# Patient Record
(demographics unavailable — no encounter records)

---

## 2025-02-18 NOTE — HISTORY OF PRESENT ILLNESS
[de-identified] : 72 yr old female presents w recently noticed left groin bulge she saw in the shower about 2 months ago. She is minimally symptomatic.   Her PCP, Dr. Good evaluated her and referred her for possible inguinal hernia. She had an US which was read as LLQ hernia containing bowel.  She has a hx of an epigastric hernia repair in the 1990's which she think was performed w mesh.She has also had benign breast bx and a R THR.  She lives alone but her daughter lives nearby to help if needed after surgery.  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

## 2025-02-18 NOTE — PLAN
[FreeTextEntry1] : CT scan pelvis.  Robotic left inguinal hernia surgery likely - depending on results.

## 2025-02-18 NOTE — CONSULT LETTER
[Dear  ___] : Dear  [unfilled], [Courtesy Letter:] : I had the pleasure of seeing your patient, [unfilled], in my office today. [Please see my note below.] : Please see my note below. [Consult Closing:] : Thank you very much for allowing me to participate in the care of this patient.  If you have any questions, please do not hesitate to contact me. [Sincerely,] : Sincerely, [FreeTextEntry3] : Martita Ashby MD FACS Director, [facility] Division of General and Acute Care Surgery Director, Surgical Quality for [facility] Interim Director [facility] W[facility]   Office phone: [phone] Cell phone:  [phone] email:  [email or web address]

## 2025-02-18 NOTE — PHYSICAL EXAM
[Respiratory Effort] : normal respiratory effort [de-identified] : NAD [de-identified] : nml [de-identified] : + left groin hernia, reducible, nontender.  No hernia on the right.

## 2025-02-18 NOTE — ASSESSMENT
[FreeTextEntry1] : LEOPOLDO PARHAM has a RIGHT inguinal hernia determined by both physical exam and US.  She would like to have a CT scan to determine if there is colon or small bowel in her hernia. We discussed the options for management which include observation of asymptomatic or minimally symptomatic hernias, as well as surgical repair.  I have recommended a robotic approach to this particular hernia given the quicker recovery time and superior visualization of the anatomy.  We discussed the risks of the surgery which include,but are not limited to injury to the gonadal structures, intestinal injury, vascular injury, hernia recurrence, mesh infection, seroma, or hematoma.  We discussed the likelihood of bruising and swelling in the immediate post op period in the groin and that this should be managed with ice packs for the first 24 hrs. Post operative activity restrictions include no heavy lifting or core activities/straining for 14 days. Routine daily activities can be resumed in 1-2 days and light activity (such as walking for exercise or jogging) can be resumed in 7 days.  Driving can be resumed when pain level is felt to be manageable.   No swimming in pool or hot tub for 1 week post operatively.  Return to work is generally in 7-10 days,but is individualized according to the patient.  Ms. PARHAM understands the risks benefits and alternatives of the proposed inguinal hernia repair and would like to proceed.   We will determine a date for surgery at her convenience and obtain medical clearance as appropriate.  We will get a CT scan first and I will call her w the results.  She will alert the office once she has had hte CT performed.

## 2025-03-15 NOTE — DATA REVIEWED
[FreeTextEntry1] : Ct scan from 2/28/25 - I personally reviewed images and reviewed them with radiologist as well.

## 2025-03-15 NOTE — ASSESSMENT
[FreeTextEntry1] : LEOPOLDO PARHAM has a LEFT inguinal hernia determined by both physical exam and CT scan.  We discussed the options for management which include observation of asymptomatic or minimally symptomatic hernias, as well as surgical repair.  I have recommended a robotic approach to this particular hernia given the quicker recovery time and superior visualization of the anatomy.  We discussed the risks of the surgery which include,but are not limited to injury to the gonadal structures, intestinal injury, vascular injury, hernia recurrence, mesh infection, seroma, or hematoma.  We discussed the likelihood of bruising and swelling in the immediate post op period in the groin and that this should be managed with ice packs for the first 24 hrs. Post operative activity restrictions include no heavy lifting or core activities/straining for 14 days. Routine daily activities can be resumed in 1-2 days and light activity (such as walking for exercise or jogging) can be resumed in 7 days.  Driving can be resumed when pain level is felt to be manageable.   No swimming in pool or hot tub for 1 week post operatively.  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 Ms. PARHAM understands the risks benefits and alternatives of the proposed inguinal hernia repair and would like to proceed.   We will determine a date for surgery at her convenience and obtain medical clearance as appropriate.  She would like to possible have this during April school spring break.

## 2025-03-15 NOTE — HISTORY OF PRESENT ILLNESS
[de-identified] : 72 yr old female presents w recently noticed left groin bulge she saw in the shower about 2 months ago. She is minimally symptomatic.   Her PCP, Dr. Good evaluated her and referred her for possible inguinal hernia. She had an US which was read as LLQ hernia containing bowel.  She has a hx of an epigastric hernia repair in the 1990's which she think was performed w mesh.She has also had benign breast bx and a R THR.  She lives alone but her daughter lives nearby to help if needed after surgery.  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  She had a CT scan because she wanted to see if bowel was indeed in her hernia - but the inital read was NO inguinal hernia.  Upon further review after I looked at the images and spoke w the radiologist, he was able to appreciate a left inguinal hernia with NO bowel incarceration.  Mrs. Julio is here today to discuss surgical options.

## 2025-03-15 NOTE — CONSULT LETTER
[Dear  ___] : Dear  [unfilled], [Consult Letter:] : I had the pleasure of evaluating your patient, [unfilled]. [Please see my note below.] : Please see my note below. [Sincerely,] : Sincerely, [FreeTextEntry3] : Martita Ashby MD FACS Director, [facility] Division of General and Acute Care Surgery Director, Surgical Quality for [facility] Interim Director [facility] Wound Care Center [facility]   Office phone: [phone] Cell phone:  [phone] email:  [email or web address]

## 2025-03-15 NOTE — PHYSICAL EXAM
[Respiratory Effort] : normal respiratory effort [de-identified] : NAD [de-identified] : +left inguinal hernia, reducible. no evidence of other hernias.

## 2025-05-01 NOTE — HISTORY OF PRESENT ILLNESS
[de-identified] : 72 yr old s/p robotic LEFT inguinal hernia on 4/16/25.  She is doing well.  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  She would like to return next week.

## 2025-05-01 NOTE — PHYSICAL EXAM
[Respiratory Effort] : normal respiratory effort [No Rash or Lesion] : No rash or lesion [Calm] : calm [de-identified] : NAD [de-identified] : incisions well healed.

## 2025-05-01 NOTE — CONSULT LETTER
[Dear  ___] : Dear  [unfilled], [Courtesy Letter:] : I had the pleasure of seeing your patient, [unfilled], in my office today. [Please see my note below.] : Please see my note below. [Consult Closing:] : Thank you very much for allowing me to participate in the care of this patient.  If you have any questions, please do not hesitate to contact me. [Sincerely,] : Sincerely, [FreeTextEntry3] : Martita Ashby MD FACS Director, [facility] Division of General and Acute Care Surgery Director, Surgical Quality for [facility] Interim Director [facility] Wound Care Center [facility]   Office phone: [phone] Cell phone:  [phone] email:  [email or web address]

## 2025-05-01 NOTE — HISTORY OF PRESENT ILLNESS
[de-identified] : 72 yr old s/p robotic LEFT inguinal hernia on 4/16/25.  She is doing well.  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  She would like to return next week.

## 2025-05-01 NOTE — PHYSICAL EXAM
[Respiratory Effort] : normal respiratory effort [No Rash or Lesion] : No rash or lesion [Calm] : calm [de-identified] : NAD [de-identified] : incisions well healed.

## 2025-05-01 NOTE — CONSULT LETTER
[Dear  ___] : Dear  [unfilled], [Courtesy Letter:] : I had the pleasure of seeing your patient, [unfilled], in my office today. [Please see my note below.] : Please see my note below. [Consult Closing:] : Thank you very much for allowing me to participate in the care of this patient.  If you have any questions, please do not hesitate to contact me. [Sincerely,] : Sincerely, [FreeTextEntry3] : Martita Ashby MD FACS Director, [facility] Division of General and Acute Care Surgery Director, Surgical Quality for [facility] Interim Director [facility] Wound Care Center [facility]   Office phone: [phone] Cell phone:  [phone] email:  [email or web address]